# Patient Record
Sex: FEMALE | Race: BLACK OR AFRICAN AMERICAN
[De-identification: names, ages, dates, MRNs, and addresses within clinical notes are randomized per-mention and may not be internally consistent; named-entity substitution may affect disease eponyms.]

---

## 2020-03-11 ENCOUNTER — HOSPITAL ENCOUNTER (INPATIENT)
Dept: HOSPITAL 35 - ER | Age: 65
LOS: 1 days | Discharge: HOME | DRG: 343 | End: 2020-03-12
Attending: SURGERY | Admitting: SURGERY
Payer: COMMERCIAL

## 2020-03-11 VITALS — HEIGHT: 65.98 IN | BODY MASS INDEX: 27.16 KG/M2 | WEIGHT: 169 LBS

## 2020-03-11 VITALS — DIASTOLIC BLOOD PRESSURE: 74 MMHG | SYSTOLIC BLOOD PRESSURE: 130 MMHG

## 2020-03-11 VITALS — DIASTOLIC BLOOD PRESSURE: 78 MMHG | SYSTOLIC BLOOD PRESSURE: 134 MMHG

## 2020-03-11 VITALS — SYSTOLIC BLOOD PRESSURE: 122 MMHG | DIASTOLIC BLOOD PRESSURE: 68 MMHG

## 2020-03-11 VITALS — SYSTOLIC BLOOD PRESSURE: 131 MMHG | DIASTOLIC BLOOD PRESSURE: 77 MMHG

## 2020-03-11 VITALS — SYSTOLIC BLOOD PRESSURE: 128 MMHG | DIASTOLIC BLOOD PRESSURE: 76 MMHG

## 2020-03-11 VITALS — DIASTOLIC BLOOD PRESSURE: 66 MMHG | SYSTOLIC BLOOD PRESSURE: 115 MMHG

## 2020-03-11 VITALS — DIASTOLIC BLOOD PRESSURE: 66 MMHG | SYSTOLIC BLOOD PRESSURE: 122 MMHG

## 2020-03-11 VITALS — SYSTOLIC BLOOD PRESSURE: 122 MMHG | DIASTOLIC BLOOD PRESSURE: 71 MMHG

## 2020-03-11 VITALS — SYSTOLIC BLOOD PRESSURE: 164 MMHG | DIASTOLIC BLOOD PRESSURE: 72 MMHG

## 2020-03-11 VITALS — SYSTOLIC BLOOD PRESSURE: 165 MMHG | DIASTOLIC BLOOD PRESSURE: 85 MMHG

## 2020-03-11 VITALS — SYSTOLIC BLOOD PRESSURE: 114 MMHG | DIASTOLIC BLOOD PRESSURE: 70 MMHG

## 2020-03-11 VITALS — DIASTOLIC BLOOD PRESSURE: 73 MMHG | SYSTOLIC BLOOD PRESSURE: 129 MMHG

## 2020-03-11 DIAGNOSIS — Z88.2: ICD-10-CM

## 2020-03-11 DIAGNOSIS — Z79.899: ICD-10-CM

## 2020-03-11 DIAGNOSIS — Z87.891: ICD-10-CM

## 2020-03-11 DIAGNOSIS — K35.80: Primary | ICD-10-CM

## 2020-03-11 DIAGNOSIS — Z88.8: ICD-10-CM

## 2020-03-11 DIAGNOSIS — I10: ICD-10-CM

## 2020-03-11 LAB
ALBUMIN SERPL-MCNC: 4.2 G/DL (ref 3.4–5)
ALT SERPL-CCNC: 34 U/L (ref 30–65)
ANION GAP SERPL CALC-SCNC: 7 MMOL/L (ref 7–16)
AST SERPL-CCNC: 22 U/L (ref 15–37)
BACTERIA-REFLEX: (no result) /HPF
BASOPHILS NFR BLD AUTO: 0.6 % (ref 0–2)
BILIRUB SERPL-MCNC: 0.6 MG/DL
BILIRUB UR-MCNC: NEGATIVE MG/DL
BUN SERPL-MCNC: 10 MG/DL (ref 7–18)
CALCIUM SERPL-MCNC: 9.6 MG/DL (ref 8.5–10.1)
CHLORIDE SERPL-SCNC: 101 MMOL/L (ref 98–107)
CO2 SERPL-SCNC: 31 MMOL/L (ref 21–32)
COLOR UR: YELLOW
CREAT SERPL-MCNC: 0.9 MG/DL (ref 0.6–1)
EOSINOPHIL NFR BLD: 0 % (ref 0–3)
ERYTHROCYTE [DISTWIDTH] IN BLOOD BY AUTOMATED COUNT: 13.2 % (ref 10.5–14.5)
GLUCOSE SERPL-MCNC: 158 MG/DL (ref 74–106)
GRANULOCYTES NFR BLD MANUAL: 91.8 % (ref 36–66)
HCT VFR BLD CALC: 43.3 % (ref 37–47)
HGB BLD-MCNC: 14.3 GM/DL (ref 12–15)
KETONES UR STRIP-MCNC: (no result) MG/DL
LIPASE: 93 U/L (ref 73–393)
LYMPHOCYTES NFR BLD AUTO: 4.8 % (ref 24–44)
MCH RBC QN AUTO: 29.7 PG (ref 26–34)
MCHC RBC AUTO-ENTMCNC: 33 G/DL (ref 28–37)
MCV RBC: 90 FL (ref 80–100)
MONOCYTES NFR BLD: 2.8 % (ref 1–8)
NEUTROPHILS # BLD: 10.2 THOU/UL (ref 1.4–8.2)
PLATELET # BLD: 305 THOU/UL (ref 150–400)
POTASSIUM SERPL-SCNC: 3.5 MMOL/L (ref 3.5–5.1)
PROT SERPL-MCNC: 7.9 G/DL (ref 6.4–8.2)
RBC # BLD AUTO: 4.81 MIL/UL (ref 4.2–5)
RBC # UR STRIP: NEGATIVE /UL
SODIUM SERPL-SCNC: 139 MMOL/L (ref 136–145)
SP GR UR STRIP: 1.02 (ref 1–1.03)
SQUAMOUS: (no result) /LPF (ref 0–3)
URINE CLARITY: CLEAR
URINE GLUCOSE-RANDOM*: NEGATIVE
URINE LEUKOCYTES-REFLEX: NEGATIVE
URINE NITRITE-REFLEX: NEGATIVE
URINE PROTEIN (DIPSTICK): (no result)
UROBILINOGEN UR STRIP-ACNC: 0.2 E.U./DL (ref 0.2–1)
WBC # BLD AUTO: 11.1 THOU/UL (ref 4–11)

## 2020-03-11 PROCEDURE — 53312: CPT

## 2020-03-11 PROCEDURE — 56526: CPT

## 2020-03-11 PROCEDURE — 56525: CPT

## 2020-03-11 PROCEDURE — 50558: CPT

## 2020-03-11 PROCEDURE — 50249: CPT

## 2020-03-11 PROCEDURE — 0WJG4ZZ INSPECTION OF PERITONEAL CAVITY, PERCUTANEOUS ENDOSCOPIC APPROACH: ICD-10-PCS | Performed by: SURGERY

## 2020-03-11 PROCEDURE — 50010 RENAL EXPLORATION: CPT

## 2020-03-11 PROCEDURE — 10195: CPT

## 2020-03-11 PROCEDURE — 51489: CPT

## 2020-03-11 PROCEDURE — 0DTJ4ZZ RESECTION OF APPENDIX, PERCUTANEOUS ENDOSCOPIC APPROACH: ICD-10-PCS | Performed by: SURGERY

## 2020-03-11 PROCEDURE — 52265 CYSTOSCOPY AND TREATMENT: CPT

## 2020-03-11 PROCEDURE — 53307: CPT

## 2020-03-11 PROCEDURE — 50739: CPT

## 2020-03-11 PROCEDURE — 70005: CPT

## 2020-03-11 PROCEDURE — 50555 KIDNEY ENDOSCOPY & BIOPSY: CPT

## 2020-03-11 PROCEDURE — 56462: CPT

## 2020-03-11 PROCEDURE — 54118: CPT

## 2020-03-11 PROCEDURE — 54022: CPT

## 2020-03-11 PROCEDURE — 50411: CPT

## 2020-03-11 PROCEDURE — 50740 FUSION OF URETER & KIDNEY: CPT

## 2020-03-11 PROCEDURE — 53310: CPT

## 2020-03-11 PROCEDURE — 50101: CPT

## 2020-03-11 NOTE — EKG
[facility]
[address]
[place]   [address]                     ELECTROCARDIOGRAM REPORT      
_______________________________________________________________________________
 
Name:       KAHLIL,EMELY                  Room #:         438-P       ADM IN  
M.R.#:      [id number]                       Account #:      [id number]  
Admission:  20    Attend Phys:    Milton oCy MD  
Discharge:              Date of Birth:  [birth year]  
                                                          Report #: [id number]
                                                                    [id number]
_______________________________________________________________________________
THIS REPORT FOR:  
 
cc:  Kervin Guillory MD, Steven E. MD Couchonnal, Luis F. MD                                            ~
THIS REPORT FOR:   //name//                          
 
                         [facility] ED
                                       
Test Date:    2020               Test Time:    11:52:03
Pat Name:     EMELY GUILLORY             Department:   
Patient ID:   [id number]            Room:         [facility]
Gender:       F                        Technician:   justine malik rn
:          1955               Requested By: Polo Post
Order Number: [id number] MD:   Oscar Moreland
                                 Measurements
Intervals                              Axis          
Rate:         70                       P:            
NC:                                    QRS:          -3
QRSD:         100                      T:            -46
QT:           445                                    
QTc:          481                                    
                           Interpretive Statements
Sinus rhythm.  
Left ventricular hypertrophy
Abnormal T, consider ischemia, diffuse leads
No previous ECG available for comparison
 
Electronically Signed On 3- 16:27:58 CDT by Oscar Moreland
[email or web address]
 
 
 
 
 
 
 
 
 
 
 
 
 
 
  <ELECTRONICALLY SIGNED>
   By: Oscar Moreland MD        
  20     1627
D: 20 1152                           _____________________________________
T: 20 1152                           Oscar Moreland MD          /EPI

## 2020-03-11 NOTE — NUR
REPORT GIVEN TO PREOP NURSE- NOTIFIED PT HAS NOT RECEIVED PIP KAPIL YET DUE TO
NOT SENT FROM PHARMACY

## 2020-03-11 NOTE — NUR
Pt came to unit from recovery approx 1630. Alert and oriented x4. Denies pain.
Surgical incisions c/d/i. IVF infusing. Pt went to surgery from ED before
coming to room. Family at bedside. Fall precautions in place. Will continue to
monitor.

## 2020-03-12 VITALS — DIASTOLIC BLOOD PRESSURE: 74 MMHG | SYSTOLIC BLOOD PRESSURE: 126 MMHG

## 2020-03-12 VITALS — DIASTOLIC BLOOD PRESSURE: 61 MMHG | SYSTOLIC BLOOD PRESSURE: 116 MMHG

## 2020-03-12 LAB
ALBUMIN SERPL-MCNC: 2.9 G/DL (ref 3.4–5)
ANION GAP SERPL CALC-SCNC: 7 MMOL/L (ref 7–16)
BUN SERPL-MCNC: 15 MG/DL (ref 7–18)
CALCIUM SERPL-MCNC: 7.5 MG/DL (ref 8.5–10.1)
CHLORIDE SERPL-SCNC: 104 MMOL/L (ref 98–107)
CO2 SERPL-SCNC: 27 MMOL/L (ref 21–32)
CREAT SERPL-MCNC: 1.1 MG/DL (ref 0.6–1)
ERYTHROCYTE [DISTWIDTH] IN BLOOD BY AUTOMATED COUNT: 13.4 % (ref 10.5–14.5)
GLUCOSE SERPL-MCNC: 123 MG/DL (ref 74–106)
HCT VFR BLD CALC: 34.6 % (ref 37–47)
HGB BLD-MCNC: 11.3 GM/DL (ref 12–15)
MAGNESIUM SERPL-MCNC: 1.7 MG/DL (ref 1.8–2.4)
MCH RBC QN AUTO: 29.7 PG (ref 26–34)
MCHC RBC AUTO-ENTMCNC: 32.8 G/DL (ref 28–37)
MCV RBC: 90.4 FL (ref 80–100)
PHOSPHATE SERPL-MCNC: 3 MG/DL (ref 2.5–4.9)
PLATELET # BLD: 254 THOU/UL (ref 150–400)
POTASSIUM SERPL-SCNC: 3.2 MMOL/L (ref 3.5–5.1)
RBC # BLD AUTO: 3.82 MIL/UL (ref 4.2–5)
SODIUM SERPL-SCNC: 138 MMOL/L (ref 136–145)
WBC # BLD AUTO: 14.3 THOU/UL (ref 4–11)

## 2020-03-12 NOTE — NUR
DC ORERS RECIEVED. IV REMOVED FROM R FA. DC INSTRUCTIONS, SCRIPTS AND F/U
APPOINTMENT REVIWED WITH PT. VOLUNTEER ESCORTED PT TO MAIN ENTRANCE.

## 2020-03-12 NOTE — NUR
ASSUMED PT CARE AT 1900. PT REPORTS SORENESS WITH MOVEMENT. LAP SITES DRY AND
INTACT. UP TO TOILET TONIGHT, STEADY ON FEET. VSS. PAIN BEING MANAGED WITH
SCHEDULED PAIN MEDS. FLUIDS INFUSING PER ORDER. FAMILY AT BEDSIDE THIS
EVENING, PT NOW SLEEPING COMFORTABLY, AWAITING POSSIBLY DISCHARGE.

## 2020-03-16 NOTE — PATH
[facility]
[address]
[place]   [address]                     PATHOLOGY RPT PROCEDURE       
_______________________________________________________________________________
 
Name:       ASPEN GUILLORY                  Room #:         438-P       DIS IN  
M.R.#:      [id number]     Account #:      [id number]  
Admission:  03/11/20    Date of Birth:  [birth year]  
Discharge:  03/12/20                                    Report #:    [id number]
                                                        Path Case #: [id number]
_______________________________________________________________________________
 
L[facility] Accession Number: [id number]
.                                                                01
Material submitted:                                        .
appendix - APPENDIX
.                                                                01
Clinical history:                                          .
Appendicitis
.                                                                02
**********************************************************************
Diagnosis:
Appendix, appendectomy:
- Marked acute appendicitis along with marked acute serositis.
(IUV:pit 03/13/2020)
QTP  03/13/2020  1320 Local
**********************************************************************
.                                                                02
Electronically signed:                                     .
Shani Carlton MD, Pathologist
NPI- [id number]
.                                                                01
Gross description:                                         .
The specimen is received in formalin, labeled "Aspen Guillory appendix".
Received is a vermiform appendix measuring 8.1 cm in length by up to 1.6
cm in diameter with a moderate amount of attached mesoappendix.   The
serosal surface is dusky gray-tan to gray-brown in appearance.  The
surgical margin is closed with a line of staples.  The staples are removed
the new margin is inked black.  Sectioning reveals a dilated lumen filled
with fecal material.  The specimen is submitted representatively in
cassettes A1 through A3, with the proximal margin and bisected tip
submitted in cassettes A1 and A2.
(CAA; 3/12/2020)
QAC/QAC  03/12/2020  1300 Local
.                                                                02
Pathologist provided ICD-10:
K35.80, K65.8
.                                                                02
CPT                                                        .
337824
Specimen Comment: A courtesy copy of this report has been sent to [phone]
[phone]
Specimen Comment: [phone]
Specimen Comment: Report sent to  / DR GUILLORY
***Performed at:  01
   [facility]  [id number]
   MD Arslan Dela Cruz MD Phone:  [phone]
***Performed at:  02
 
 
[place] [address]                     PATHOLOGY RPT PROCEDURE       
_______________________________________________________________________________
 
Name:       ASPEN GUILLORY                  Room #:         438-P       DIS IN  
M.R.#:      [id number]     Account #:      [id number]  
Admission:  03/11/20    Date of Birth:  [birth year]  
Discharge:  03/12/20                                    Report #:    [id number]
                                                        Path Case #: [id number]
_______________________________________________________________________________
   [facility] [address],   [id number]
   MD Shani aCrlton MD Phone:  [phone]